# Patient Record
Sex: FEMALE | Race: OTHER | NOT HISPANIC OR LATINO
[De-identification: names, ages, dates, MRNs, and addresses within clinical notes are randomized per-mention and may not be internally consistent; named-entity substitution may affect disease eponyms.]

---

## 2017-02-06 ENCOUNTER — APPOINTMENT (OUTPATIENT)
Dept: HUMAN REPRODUCTION | Facility: CLINIC | Age: 47
End: 2017-02-06

## 2017-02-07 RX ORDER — DOXYCYCLINE HYCLATE 100 MG/1
100 CAPSULE ORAL
Qty: 5 | Refills: 0 | Status: ACTIVE | COMMUNITY
Start: 2017-02-06 | End: 1900-01-01

## 2022-04-19 ENCOUNTER — EMERGENCY (EMERGENCY)
Facility: HOSPITAL | Age: 52
LOS: 1 days | Discharge: HOME | End: 2022-04-19
Attending: EMERGENCY MEDICINE | Admitting: EMERGENCY MEDICINE
Payer: COMMERCIAL

## 2022-04-19 VITALS
DIASTOLIC BLOOD PRESSURE: 74 MMHG | WEIGHT: 149.91 LBS | RESPIRATION RATE: 18 BRPM | HEART RATE: 110 BPM | OXYGEN SATURATION: 100 % | TEMPERATURE: 98 F | HEIGHT: 65 IN | SYSTOLIC BLOOD PRESSURE: 132 MMHG

## 2022-04-19 VITALS
OXYGEN SATURATION: 100 % | HEART RATE: 83 BPM | DIASTOLIC BLOOD PRESSURE: 78 MMHG | SYSTOLIC BLOOD PRESSURE: 111 MMHG | TEMPERATURE: 98 F | RESPIRATION RATE: 18 BRPM

## 2022-04-19 DIAGNOSIS — N93.9 ABNORMAL UTERINE AND VAGINAL BLEEDING, UNSPECIFIED: ICD-10-CM

## 2022-04-19 DIAGNOSIS — Z20.822 CONTACT WITH AND (SUSPECTED) EXPOSURE TO COVID-19: ICD-10-CM

## 2022-04-19 DIAGNOSIS — N85.2 HYPERTROPHY OF UTERUS: ICD-10-CM

## 2022-04-19 DIAGNOSIS — R42 DIZZINESS AND GIDDINESS: ICD-10-CM

## 2022-04-19 DIAGNOSIS — Z88.0 ALLERGY STATUS TO PENICILLIN: ICD-10-CM

## 2022-04-19 DIAGNOSIS — Z88.2 ALLERGY STATUS TO SULFONAMIDES: ICD-10-CM

## 2022-04-19 DIAGNOSIS — D25.1 INTRAMURAL LEIOMYOMA OF UTERUS: ICD-10-CM

## 2022-04-19 DIAGNOSIS — I10 ESSENTIAL (PRIMARY) HYPERTENSION: ICD-10-CM

## 2022-04-19 DIAGNOSIS — E78.5 HYPERLIPIDEMIA, UNSPECIFIED: ICD-10-CM

## 2022-04-19 LAB
ALBUMIN SERPL ELPH-MCNC: 3.7 G/DL — SIGNIFICANT CHANGE UP (ref 3.3–5)
ALP SERPL-CCNC: 71 U/L — SIGNIFICANT CHANGE UP (ref 40–120)
ALT FLD-CCNC: 17 U/L — SIGNIFICANT CHANGE UP (ref 10–45)
ANION GAP SERPL CALC-SCNC: 13 MMOL/L — SIGNIFICANT CHANGE UP (ref 5–17)
APPEARANCE UR: CLEAR — SIGNIFICANT CHANGE UP
APTT BLD: 27.2 SEC — LOW (ref 27.5–35.5)
AST SERPL-CCNC: 26 U/L — SIGNIFICANT CHANGE UP (ref 10–40)
BACTERIA # UR AUTO: PRESENT /HPF
BASOPHILS # BLD AUTO: 0.03 K/UL — SIGNIFICANT CHANGE UP (ref 0–0.2)
BASOPHILS NFR BLD AUTO: 0.8 % — SIGNIFICANT CHANGE UP (ref 0–2)
BILIRUB SERPL-MCNC: 0.3 MG/DL — SIGNIFICANT CHANGE UP (ref 0.2–1.2)
BILIRUB UR-MCNC: NEGATIVE — SIGNIFICANT CHANGE UP
BLD GP AB SCN SERPL QL: NEGATIVE — SIGNIFICANT CHANGE UP
BUN SERPL-MCNC: 11 MG/DL — SIGNIFICANT CHANGE UP (ref 7–23)
CALCIUM SERPL-MCNC: 9.1 MG/DL — SIGNIFICANT CHANGE UP (ref 8.4–10.5)
CHLORIDE SERPL-SCNC: 104 MMOL/L — SIGNIFICANT CHANGE UP (ref 96–108)
CO2 SERPL-SCNC: 23 MMOL/L — SIGNIFICANT CHANGE UP (ref 22–31)
COLOR SPEC: YELLOW — SIGNIFICANT CHANGE UP
CREAT SERPL-MCNC: 0.8 MG/DL — SIGNIFICANT CHANGE UP (ref 0.5–1.3)
DIFF PNL FLD: ABNORMAL
EGFR: 89 ML/MIN/1.73M2 — SIGNIFICANT CHANGE UP
EOSINOPHIL # BLD AUTO: 0.13 K/UL — SIGNIFICANT CHANGE UP (ref 0–0.5)
EOSINOPHIL NFR BLD AUTO: 3.6 % — SIGNIFICANT CHANGE UP (ref 0–6)
EPI CELLS # UR: SIGNIFICANT CHANGE UP /HPF (ref 0–5)
GLUCOSE SERPL-MCNC: 97 MG/DL — SIGNIFICANT CHANGE UP (ref 70–99)
GLUCOSE UR QL: NEGATIVE — SIGNIFICANT CHANGE UP
HCG SERPL-ACNC: <0 MIU/ML — SIGNIFICANT CHANGE UP
HCT VFR BLD CALC: 30.7 % — LOW (ref 34.5–45)
HGB BLD-MCNC: 9.6 G/DL — LOW (ref 11.5–15.5)
IMM GRANULOCYTES NFR BLD AUTO: 0.3 % — SIGNIFICANT CHANGE UP (ref 0–1.5)
INR BLD: 1.02 — SIGNIFICANT CHANGE UP (ref 0.88–1.16)
KETONES UR-MCNC: ABNORMAL MG/DL
LEUKOCYTE ESTERASE UR-ACNC: NEGATIVE — SIGNIFICANT CHANGE UP
LYMPHOCYTES # BLD AUTO: 0.87 K/UL — LOW (ref 1–3.3)
LYMPHOCYTES # BLD AUTO: 24 % — SIGNIFICANT CHANGE UP (ref 13–44)
MCHC RBC-ENTMCNC: 28.7 PG — SIGNIFICANT CHANGE UP (ref 27–34)
MCHC RBC-ENTMCNC: 31.3 GM/DL — LOW (ref 32–36)
MCV RBC AUTO: 91.9 FL — SIGNIFICANT CHANGE UP (ref 80–100)
MONOCYTES # BLD AUTO: 0.4 K/UL — SIGNIFICANT CHANGE UP (ref 0–0.9)
MONOCYTES NFR BLD AUTO: 11 % — SIGNIFICANT CHANGE UP (ref 2–14)
NEUTROPHILS # BLD AUTO: 2.19 K/UL — SIGNIFICANT CHANGE UP (ref 1.8–7.4)
NEUTROPHILS NFR BLD AUTO: 60.3 % — SIGNIFICANT CHANGE UP (ref 43–77)
NITRITE UR-MCNC: NEGATIVE — SIGNIFICANT CHANGE UP
NRBC # BLD: 0 /100 WBCS — SIGNIFICANT CHANGE UP (ref 0–0)
PH UR: 6 — SIGNIFICANT CHANGE UP (ref 5–8)
PLATELET # BLD AUTO: 321 K/UL — SIGNIFICANT CHANGE UP (ref 150–400)
POTASSIUM SERPL-MCNC: 3.5 MMOL/L — SIGNIFICANT CHANGE UP (ref 3.5–5.3)
POTASSIUM SERPL-SCNC: 3.5 MMOL/L — SIGNIFICANT CHANGE UP (ref 3.5–5.3)
PROT SERPL-MCNC: 6.7 G/DL — SIGNIFICANT CHANGE UP (ref 6–8.3)
PROT UR-MCNC: NEGATIVE MG/DL — SIGNIFICANT CHANGE UP
PROTHROM AB SERPL-ACNC: 12.2 SEC — SIGNIFICANT CHANGE UP (ref 10.5–13.4)
RBC # BLD: 3.34 M/UL — LOW (ref 3.8–5.2)
RBC # FLD: 13.3 % — SIGNIFICANT CHANGE UP (ref 10.3–14.5)
RBC CASTS # UR COMP ASSIST: ABNORMAL /HPF
RH IG SCN BLD-IMP: POSITIVE — SIGNIFICANT CHANGE UP
SARS-COV-2 RNA SPEC QL NAA+PROBE: NEGATIVE — SIGNIFICANT CHANGE UP
SODIUM SERPL-SCNC: 140 MMOL/L — SIGNIFICANT CHANGE UP (ref 135–145)
SP GR SPEC: 1.02 — SIGNIFICANT CHANGE UP (ref 1–1.03)
UROBILINOGEN FLD QL: 0.2 E.U./DL — SIGNIFICANT CHANGE UP
WBC # BLD: 3.63 K/UL — LOW (ref 3.8–10.5)
WBC # FLD AUTO: 3.63 K/UL — LOW (ref 3.8–10.5)
WBC UR QL: < 5 /HPF — SIGNIFICANT CHANGE UP

## 2022-04-19 PROCEDURE — 76830 TRANSVAGINAL US NON-OB: CPT

## 2022-04-19 PROCEDURE — 36415 COLL VENOUS BLD VENIPUNCTURE: CPT

## 2022-04-19 PROCEDURE — 80053 COMPREHEN METABOLIC PANEL: CPT

## 2022-04-19 PROCEDURE — 76830 TRANSVAGINAL US NON-OB: CPT | Mod: 26

## 2022-04-19 PROCEDURE — 86901 BLOOD TYPING SEROLOGIC RH(D): CPT

## 2022-04-19 PROCEDURE — 76856 US EXAM PELVIC COMPLETE: CPT | Mod: 26

## 2022-04-19 PROCEDURE — 85730 THROMBOPLASTIN TIME PARTIAL: CPT

## 2022-04-19 PROCEDURE — 81001 URINALYSIS AUTO W/SCOPE: CPT

## 2022-04-19 PROCEDURE — 87635 SARS-COV-2 COVID-19 AMP PRB: CPT

## 2022-04-19 PROCEDURE — 85610 PROTHROMBIN TIME: CPT

## 2022-04-19 PROCEDURE — 85025 COMPLETE CBC W/AUTO DIFF WBC: CPT

## 2022-04-19 PROCEDURE — 86900 BLOOD TYPING SEROLOGIC ABO: CPT

## 2022-04-19 PROCEDURE — 99284 EMERGENCY DEPT VISIT MOD MDM: CPT | Mod: 25

## 2022-04-19 PROCEDURE — 76856 US EXAM PELVIC COMPLETE: CPT

## 2022-04-19 PROCEDURE — 86850 RBC ANTIBODY SCREEN: CPT

## 2022-04-19 PROCEDURE — 84702 CHORIONIC GONADOTROPIN TEST: CPT

## 2022-04-19 PROCEDURE — 99285 EMERGENCY DEPT VISIT HI MDM: CPT

## 2022-04-19 NOTE — CONSULT NOTE ADULT - ASSESSMENT
52yo  with LMP 2 weeks ago presents with HMB likely 2/2 uterine fibroids.  - TVUS showed bulky multifibroid uterus. Patient with long hx of uterine fibroids leading to HMB which temporarily improved in the past after surgical management.  - No active bleeding on exam today, patient without symptoms of anemia. Hgb reassuring at 9.6. No acute GYN interventions needed at this time.  - Patient will follow up with her OB Dr. Ron this week. Patient counselled that she will need an EMB and consultation to discuss medical vs surgical management of her recurrent fibroids.  - d/w Dr. Kimball 54yo  with LMP 2 weeks ago presents with HMB likely 2/2 uterine fibroids.  - TVUS showed bulky multifibroid uterus. Patient with long hx of uterine fibroids leading to HMB which temporarily improved in the past after surgical management.  - No active bleeding on exam today, patient without symptoms of anemia. Hgb reassuring at 9.6. VSS ( on presentation, manual pulse performed at bedside 86bpm prior to d/c) No acute GYN interventions needed at this time.  - Patient will follow up with her OB Dr. Ron this week. Patient counselled that she will need an EMB and consultation to discuss medical vs surgical management of her recurrent fibroids.  - d/w Dr. Kimball

## 2022-04-19 NOTE — ED PROVIDER NOTE - NSFOLLOWUPINSTRUCTIONS_ED_ALL_ED_FT
Please follow up tomorrow with Dr. Ron as instructed      Uterine Fibroids       Uterine fibroids, also called leiomyomas, are noncancerous (benign) tumors that can grow in the uterus. They can cause heavy menstrual bleeding and pain. Fibroids may also grow in the fallopian tubes, cervix, or tissues (ligaments) near the uterus.    You may have one or many fibroids. Fibroids vary in size, weight, and where they grow in the uterus. Some can become quite large. Most fibroids do not require medical treatment.      What are the causes?    The cause of this condition is not known.      What increases the risk?    You are more likely to develop this condition if you:  •Are in your 30s or 40s and have not gone through menopause.      •Have a family history of this condition.      •Are of  descent.      •Started your menstrual period at age 10 or younger.      •Have never given birth.      •Are overweight or obese.        What are the signs or symptoms?    Many women do not have any symptoms. Symptoms of this condition may include:  •Heavy menstrual bleeding.      •Bleeding between menstrual periods.      •Pain and pressure in the pelvic area, between your hip bones.      •Pain during sex.      •Bladder problems, such as needing to urinate right away or more often than usual.      •Inability to have children (infertility).      •Failure to carry pregnancy to term (miscarriage).        How is this diagnosed?    This condition may be diagnosed based on:  •Your symptoms and medical history.      •A physical exam.      •A pelvic exam that includes feeling for any tumors.      •Imaging tests, such as ultrasound or MRI.        How is this treated?    Treatment for this condition may include follow-up visits with your health care provider to monitor your fibroids for any changes. Other treatment may include:•Medicines, such as:   •Medicines to relieve pain, including aspirin and NSAIDs, such as ibuprofen or naproxen.      •Hormone therapy. Treatment may be given as a pill or an injection, or it may be inserted into the uterus using an intrauterine device (IUD).      •Surgery that would do one of the following:  •Remove the fibroids (myomectomy). This may be recommended if fibroids affect your fertility and you want to become pregnant.      •Remove the uterus (hysterectomy).      •Block the blood supply to the fibroids (uterine artery embolization). This can cause them to shrink and die.          Follow these instructions at home:    Medicines     •Take over-the-counter and prescription medicines only as told by your health care provider.      •Ask your health care provider if you should take iron pills or eat more iron-rich foods, such as dark green, leafy vegetables. Heavy menstrual bleeding can cause low iron levels.        Managing pain      If directed, apply heat to your back or abdomen to reduce pain. Use the heat source that your health care provider recommends, such as a moist heat pack or a heating pad. To apply heat:  •Place a towel between your skin and the heat source.      •Leave the heat on for 20–30 minutes.      •Remove the heat if your skin turns bright red. This is especially important if you are unable to feel pain, heat, or cold. You may have a greater risk of getting burned.      General instructions   •Pay close attention to your menstrual cycle. Tell your health care provider about any changes, such as:  •Heavier bleeding that requires you to change your pads or tampons more than usual.      •A change in the number of days that your menstrual period lasts.      •A change in symptoms that come with your menstrual period, such as back pain or cramps in your abdomen.        •Keep all follow-up visits. This is important, especially if your fibroids need to be monitored for any changes.        Contact a health care provider if you:    •Have pelvic pain, back pain, or cramps in your abdomen that do not get better with medicine or heat.      •Develop new bleeding between menstrual periods.      •Have increased bleeding during or between menstrual periods.      •Feel more tired or weak than usual.      •Feel light-headed.        Get help right away if you:    •Faint.      •Have pelvic pain that suddenly gets worse.      •Have severe vaginal bleeding that soaks a tampon or pad in 30 minutes or less.        Summary    •Uterine fibroids are noncancerous (benign) tumors that can develop in the uterus.      •The exact cause of this condition is not known.      •Most fibroids do not require medical treatment unless they affect your ability to have children (fertility).      •Contact a health care provider if you have pelvic pain, back pain, or cramps in your abdomen that do not get better with medicines.      •Get help right away if you faint, have pelvic pain that suddenly gets worse, or have severe vaginal bleeding.      This information is not intended to replace advice given to you by your health care provider. Make sure you discuss any questions you have with your health care provider.

## 2022-04-19 NOTE — ED PROVIDER NOTE - CARE PROVIDER_API CALL
Ede Ron  OBSTETRICS AND GYNECOLOGY  [address] [place] [id number]  Phone: [phone]  Fax: [phone]  Follow Up Time:

## 2022-04-19 NOTE — ED ADULT NURSE NOTE - OBJECTIVE STATEMENT
Pt presents co vaginal bleeding 1 month ago worst in the 2 days, says bleeding becoming heavier, going through 6 tampons per day.  Hx of uterine fibroids requiring myomectomy/ blood transfusion. denies lightheadedness, dizziness.

## 2022-04-19 NOTE — ED PROVIDER NOTE - ATTENDING APP SHARED VISIT CONTRIBUTION OF CARE
53 yo F with PMH of HTN and PSH of myomectomy x 2 p/w vaginal bleeding x 2 weeks. Reports menses started 2 weeks ago, has been bleeding since. Bleeding increased on Sunday evening w/ clots, changing tampon/pad q2 hours. Felt lightheaded last night. Reports menses prolonged since January. Pt with h/o blood transfusion yrs ago. Denies f/c, headache, fainting, chest pain, sob, palpitations, abd pain, nvd, urinary sxs, trauma. VS noted. Pt mildly tachycardia, well appearing, Abd: nondistended, soft, nontender, + palpable fibroid uterus, no cvat. Pelvic exam per PA note. Labs/studies noted. Stable h/h, no active bleeding on exam. Sonogram w/ mult fibroids. Pt evaliated by GYN and can f/u tomorrow w/ Dr. Ron. Strict return parameters given.

## 2022-04-19 NOTE — ED PROVIDER NOTE - PATIENT PORTAL LINK FT
You can access the FollowMyHealth Patient Portal offered by [facility] by registering at the following website: [email or web address]. By joining [facility]’s FollowMyHealth portal, you will also be able to view your health information using other applications (apps) compatible with our system.

## 2022-04-19 NOTE — ED PROVIDER NOTE - PHYSICAL EXAMINATION
Vitals reviewed  Gen: well appearing, nad, speaking in full sentences  Skin: wwp, no rash/lesions  HEENT: ncat, eomi, mmm  CV: tachycardia, regular rhythm, no audible m/r/g  Resp: symmetrical expansion, ctab, no w/r/r  Abd: nondistended, soft, nontender, + palpable fibroid uterus, no cvat  Pelvic: clots noted in vaginal canal, no active bleeding, no cmt, no adnexal mass or ttp, palpable fibroid uterus nontender  Ext: FROM throughout, no peripheral edema  Neuro: alert/oriented, no focal deficits, steady gait

## 2022-04-19 NOTE — CONSULT NOTE ADULT - SUBJECTIVE AND OBJECTIVE BOX
54yo  with LMP 2 weeks ago presents with HMB.  She reports long hx of HMB 2/2 fibroids. She had a hysteroscopic myomectomy in , abd myomectomy x2 around  which were a few years apart (patient does not remember), with improvement in HMB for a few years after. She has tried OCP's a number of years ago but stated that it worsened her bleeding. Her current run of heavy bleeding began in January where she had HMB for 2 weeks, with spotting after, then her next menses was also prolonged at 2 weeks, with spotting after until her next cycle. Her LMP started 2 weeks ago and she has saturated 2 pads per hour since then. She had some dizziness yesterday which resolved. Denies CP, SOB, dizziness, N/V, issues with voiding or BM. Patient see's Dr. Ron for her GYN care, and plans to see him tomorrow for consultation for AUB.    OBhx:  vtop D+C  GYnhx: fibroids as per HPI  PMH: HTN, HLD  PSH: abd myomectomy x 2 around , hysteroscopy myomectomy in   meds: nifedipine 60mg qd, HCTZ 12.5mg qd, atorvastatin  all: bactrim and penicillin (Hives for both)  social: denies    Vital Signs Last 24 Hrs  T(C): 36.7 (2022 09:18), Max: 36.7 (2022 09:18)  T(F): 98 (2022 09:18), Max: 98 (2022 09:18)  HR: 110 (2022 09:18) (110 - 110)  BP: 132/74 (2022 09:18) (132/74 - 132/74)  BP(mean): --  RR: 18 (2022 09:18) (18 - 18)  SpO2: 100% (2022 09:18) (100% - 100%)    Physical Exam:  Gen: NAD, comfortable  GI: soft, nontender, nondistended + BS, no rebound no guarding. Uterus enlarged.  BME: uterus noted to be about 15w in size, no adnexal masses appreciated , No cervical motion tenderness. Posterior fornix with mass palpated, likely fibroid.  Spec: 5cc blood in vaginal vault. Normal cervix and vaginal vault, no active bleeding even with valsalva    LABS:                        9.6    3.63  )-----------( 321      ( 2022 10:43 )             30.7           PT/INR - ( 2022 10:43 )   PT: 12.2 sec;   INR: 1.02          PTT - ( 2022 10:43 )  PTT:27.2 sec  Urinalysis Basic - ( 2022 10:20 )    Color: Yellow / Appearance: Clear / S.025 / pH: x  Gluc: x / Ketone: Trace mg/dL  / Bili: Negative / Urobili: 0.2 E.U./dL   Blood: x / Protein: NEGATIVE mg/dL / Nitrite: NEGATIVE   Leuk Esterase: NEGATIVE / RBC: Many /HPF / WBC < 5 /HPF   Sq Epi: x / Non Sq Epi: 0-5 /HPF / Bacteria: Present /HPF        RADIOLOGY & ADDITIONAL STUDIES:   54yo  with LMP 2 weeks ago presents with HMB.  She reports long hx of HMB 2/2 fibroids. She had a hysteroscopic myomectomy in , abd myomectomy x2 around  which were a few years apart (patient does not remember), with improvement in HMB for a few years after. She has tried OCP's a number of years ago but stated that it worsened her bleeding. Her current run of heavy bleeding began in January where she had HMB for 2 weeks, with spotting after, then her next menses was also prolonged at 2 weeks, with spotting after until her next cycle. Her LMP started 2 weeks ago and she has saturated 2 pads per hour since then. She had some dizziness yesterday which resolved. Denies CP, SOB, dizziness, N/V, issues with voiding or BM. Patient see's Dr. Ron for her GYN care, and plans to see him tomorrow for consultation for AUB.    OBhx:  vtop D+C  GYnhx: fibroids as per HPI  PMH: HTN, HLD  PSH: abd myomectomy x 2 around , hysteroscopy myomectomy in   meds: nifedipine 60mg qd, HCTZ 12.5mg qd, atorvastatin  all: bactrim and penicillin (Hives for both)  social: denies    Vital Signs Last 24 Hrs  T(C): 36.7 (2022 09:18), Max: 36.7 (2022 09:18)  T(F): 98 (2022 09:18), Max: 98 (2022 09:18)  HR: 110 (2022 09:18) (110 - 110)  BP: 132/74 (2022 09:18) (132/74 - 132/74)  BP(mean): --  RR: 18 (2022 09:18) (18 - 18)  SpO2: 100% (2022 09:18) (100% - 100%)    Physical Exam:  Gen: NAD, comfortable  GI: soft, nontender, nondistended + BS, no rebound no guarding. Uterus enlarged.  BME: uterus noted to be about 15w in size, no adnexal masses appreciated , No cervical motion tenderness. Posterior fornix with mass palpated, likely fibroid.  Spec: 5cc blood in vaginal vault. Normal cervix and vaginal vault, no active bleeding even with valsalva    LABS:                        9.6    3.63  )-----------( 321      ( 2022 10:43 )             30.7           PT/INR - ( 2022 10:43 )   PT: 12.2 sec;   INR: 1.02          PTT - ( 2022 10:43 )  PTT:27.2 sec  Urinalysis Basic - ( 2022 10:20 )    Color: Yellow / Appearance: Clear / S.025 / pH: x  Gluc: x / Ketone: Trace mg/dL  / Bili: Negative / Urobili: 0.2 E.U./dL   Blood: x / Protein: NEGATIVE mg/dL / Nitrite: NEGATIVE   Leuk Esterase: NEGATIVE / RBC: Many /HPF / WBC < 5 /HPF   Sq Epi: x / Non Sq Epi: 0-5 /HPF / Bacteria: Present /HPF        RADIOLOGY & ADDITIONAL STUDIES:    ACC: [id number] EXAM:  US TRANSVAGINAL                        ACC: [id number] EXAM:  US PELVIC COMPLETE                          PROCEDURE DATE:  2022          INTERPRETATION:  CLINICAL INFORMATION: Vaginal bleeding. Fibroids.   History of myomectomy.    LMP: 4/3/2022.    COMPARISON: None available.    TECHNIQUE:  Endovaginal and transabdominal pelvic sonogram. Color and Spectral   Doppler was performed.    FINDINGS:  Uterus: 13.6 cm x 10.5 cm x 10.8 cm. Uterus is enlarged and lobular.   There are several fibroids. 4.6 x 4.4 x 4.9 cm fibroid anterior body. 2.9   x 2.4 x 2.5 cm calcified intramural fibroid right body. 2.9 x 3.4 x 4.0   cm intramural fibroid posterior fundus.  Endometrium: Incompletely visualized due to multiple fibroids. Visualized   portion measures 0.7 cm.    Right ovary: 3.2 cm x 1.7 cm x 1.3 cm. Within normal limits. Normal   arterial and venous waveforms.  Left ovary: Not visualized.    Fluid: None.    IMPRESSION:  1. Enlarged, leiomyomatous uterus.    2. Endometrium is incompletely visualized due to the multiple fibroids.   If indicated, MRI of pelvis can be performed for better evaluation.    3. Left ovary not visualized due to enlarged uterus. Again, MRI could be   performed for better evaluation.        --- End of Report ---            TEVIN SANCHEZ MD; Attending Radiologist  This document has been electronically signed. 2022  2:59PM

## 2022-04-19 NOTE — ED PROVIDER NOTE - CLINICAL SUMMARY MEDICAL DECISION MAKING FREE TEXT BOX
52 F pmh htn, psh myomectomy x2 p/w vaginal bleeding x 2 weeks., changes pad/tampon q2hrs since Sunday night, + lightheadedness. 52 F pmh htn, psh myomectomy x2 p/w vaginal bleeding x 2 weeks., changes pad/tampon q2hrs since Sunday night, + lightheadedness.  pt mild tachycardia, well appearing, Abd: nondistended, soft, nontender, + palpable fibroid uterus, no cvat; Pelvic: clots noted in vaginal canal, no active bleeding, no cmt, no adnexal mass or ttp, palpable fibroid uterus nontender.  will obtain labs, sonogram, c/s gyn (follows w/ Dr. Ron)

## 2022-04-19 NOTE — ED PROVIDER NOTE - NS ED ATTENDING STATEMENT MOD
This was a shared visit with the TONEY. I reviewed and verified the documentation and independently performed the documented:

## 2022-04-19 NOTE — ED ADULT TRIAGE NOTE - CHIEF COMPLAINT QUOTE
Pt presents co vaginal bleeding x14 days, says bleeding becoming heavier, going through one tampon q2h.  Hx of uterine fibroids requiring myomectomy. denies lightheadedness, dizziness.

## 2022-04-19 NOTE — ED PROVIDER NOTE - OBJECTIVE STATEMENT
52 F pmh htn, psh myomectomy x2 p/w vaginal bleeding x 2 weeks.  pt reports menses started 2 weeks ago, has been bleeding since.  Increased Sunday evening w/ clots, changing tampon/pad q2 hours.  felt lightheaded last night.  reports menses prolonged since January.  h/o blood transfusion x 1 8 yrs ago.  denies f/c, headache, fainting, chest pain, sob, palpitations, abd pain, nvd, urinary sxs, trauma  follows w/ Dr. Ron

## 2022-04-19 NOTE — ED PROVIDER NOTE - PROGRESS NOTE DETAILS
stable h/h, no active bleeding on exam.  sonogram w/ mult fibroids.  eval by GYN and can f/u tomorrow w/ Dr. Ron.  discussed strict return parameters

## 2022-04-20 PROBLEM — D21.9 BENIGN NEOPLASM OF CONNECTIVE AND OTHER SOFT TISSUE, UNSPECIFIED: Chronic | Status: ACTIVE | Noted: 2022-04-19

## 2022-05-06 ENCOUNTER — APPOINTMENT (OUTPATIENT)
Dept: MRI IMAGING | Facility: HOSPITAL | Age: 52
End: 2022-05-06
Payer: COMMERCIAL

## 2022-05-13 ENCOUNTER — APPOINTMENT (OUTPATIENT)
Dept: MRI IMAGING | Facility: HOSPITAL | Age: 52
End: 2022-05-13

## 2022-05-13 ENCOUNTER — OUTPATIENT (OUTPATIENT)
Dept: OUTPATIENT SERVICES | Facility: HOSPITAL | Age: 52
LOS: 1 days | End: 2022-05-13
Payer: COMMERCIAL

## 2022-05-13 PROCEDURE — A9585: CPT

## 2022-05-13 PROCEDURE — 72197 MRI PELVIS W/O & W/DYE: CPT | Mod: 26

## 2022-05-13 PROCEDURE — 72197 MRI PELVIS W/O & W/DYE: CPT

## 2022-06-28 ENCOUNTER — APPOINTMENT (OUTPATIENT)
Dept: INTERVENTIONAL RADIOLOGY/VASCULAR | Facility: HOSPITAL | Age: 52
End: 2022-06-28

## 2022-06-28 DIAGNOSIS — I10 ESSENTIAL (PRIMARY) HYPERTENSION: ICD-10-CM

## 2022-06-28 DIAGNOSIS — D25.1 INTRAMURAL LEIOMYOMA OF UTERUS: ICD-10-CM

## 2022-06-28 DIAGNOSIS — D25.0 INTRAMURAL LEIOMYOMA OF UTERUS: ICD-10-CM

## 2022-06-28 PROCEDURE — 99203 OFFICE O/P NEW LOW 30 MIN: CPT | Mod: 95

## 2022-06-28 NOTE — HISTORY OF PRESENT ILLNESS
[FreeTextEntry1] : Apurva Hopson is a 52 year old  first diagnosed with fibroids 10 years ago status post myomectomy x 2 with menorrhagia 7 days dyspareunia, abdominal pressure, cramps and urinary frequency.  MRI shows multifibroid uterus with two submucosal partially intracavitary fibroids largest 3.7 cm. [Menorrhagia] : ~T menorrhagia [Pelvic Pain] : pelvic pain [Urinary Frequency] : urinary frequency [Pressure] : pressure [Bloating] : bloating [Myomectomy] : myomectomy [Last Menstrual Period (___)] : Last menstrual period [unfilled] [Monthly Cycles Regular] : monthly cycles are regular [Bleeding In Between Periods] : no bleeding in between periods [Menopausal Symptoms] : no complaints of menopausal symptoms [Post Menopause] : not post menopausal [G ___] : G [unfilled] [P___] : P [unfilled]

## 2022-06-28 NOTE — CONSULT LETTER
[Dear  ___] : Dear  [unfilled], [Consult Letter:] : I had the pleasure of evaluating your patient, [unfilled]. [Please see my note below.] : Please see my note below. [Consult Closing:] : Thank you very much for allowing me to participate in the care of this patient.  If you have any questions, please do not hesitate to contact me. [Sincerely,] : Sincerely, [FreeTextEntry3] : Han Eckert MD, FSIR\par Chief Interventional Radiology\par [facility]\par [facility]\par

## 2022-06-28 NOTE — ASSESSMENT
[FreeTextEntry1] : 52 year old with menorrhagia secondary to uterine fibroids.  She is a candidate for UAE.  The procedure and possible complications including detachment of intracavitary fibroids requiring surgical removal or possible hysterectomy were discussed at length and the patient had opportunity to ask questions.

## 2022-07-25 ENCOUNTER — APPOINTMENT (OUTPATIENT)
Dept: INTERVENTIONAL RADIOLOGY/VASCULAR | Facility: HOSPITAL | Age: 52
End: 2022-07-25

## 2022-07-25 ENCOUNTER — OUTPATIENT (OUTPATIENT)
Dept: OUTPATIENT SERVICES | Facility: HOSPITAL | Age: 52
LOS: 1 days | End: 2022-07-25
Payer: COMMERCIAL

## 2022-07-25 ENCOUNTER — RESULT REVIEW (OUTPATIENT)
Age: 52
End: 2022-07-25

## 2022-07-25 PROCEDURE — C1887: CPT

## 2022-07-25 PROCEDURE — 37243 VASC EMBOLIZE/OCCLUDE ORGAN: CPT

## 2022-07-25 PROCEDURE — 36247 INS CATH ABD/L-EXT ART 3RD: CPT | Mod: 59

## 2022-07-25 PROCEDURE — 36247 INS CATH ABD/L-EXT ART 3RD: CPT

## 2022-07-25 PROCEDURE — C1889: CPT

## 2022-07-25 PROCEDURE — C1769: CPT

## 2022-07-25 PROCEDURE — C1894: CPT

## 2022-07-25 RX ORDER — ONDANSETRON 8 MG/1
1 TABLET, FILM COATED ORAL
Qty: 9 | Refills: 0
Start: 2022-07-25 | End: 2022-07-27

## 2022-07-25 RX ORDER — OXYCODONE HYDROCHLORIDE 5 MG/1
1 TABLET ORAL
Qty: 20 | Refills: 0
Start: 2022-07-25 | End: 2022-07-29

## 2022-07-25 RX ORDER — IBUPROFEN 200 MG
1 TABLET ORAL
Qty: 40 | Refills: 0
Start: 2022-07-25 | End: 2022-08-03

## 2022-08-15 ENCOUNTER — APPOINTMENT (OUTPATIENT)
Dept: INTERVENTIONAL RADIOLOGY/VASCULAR | Facility: HOSPITAL | Age: 52
End: 2022-08-15

## 2022-08-15 PROCEDURE — XXXXX: CPT

## 2022-09-16 ENCOUNTER — APPOINTMENT (OUTPATIENT)
Dept: MRI IMAGING | Facility: HOSPITAL | Age: 52
End: 2022-09-16

## 2022-09-16 ENCOUNTER — OUTPATIENT (OUTPATIENT)
Dept: OUTPATIENT SERVICES | Facility: HOSPITAL | Age: 52
LOS: 1 days | End: 2022-09-16
Payer: COMMERCIAL

## 2022-09-16 ENCOUNTER — RESULT REVIEW (OUTPATIENT)
Age: 52
End: 2022-09-16

## 2022-09-16 PROCEDURE — A9585: CPT

## 2022-09-16 PROCEDURE — 72197 MRI PELVIS W/O & W/DYE: CPT

## 2022-09-16 PROCEDURE — 72197 MRI PELVIS W/O & W/DYE: CPT | Mod: 26

## 2022-09-19 ENCOUNTER — APPOINTMENT (OUTPATIENT)
Dept: INTERVENTIONAL RADIOLOGY/VASCULAR | Facility: HOSPITAL | Age: 52
End: 2022-09-19
Payer: COMMERCIAL

## 2022-09-19 PROCEDURE — XXXXX: CPT | Mod: 1L

## 2023-02-11 ENCOUNTER — OUTPATIENT (OUTPATIENT)
Dept: OUTPATIENT SERVICES | Facility: HOSPITAL | Age: 53
LOS: 1 days | End: 2023-02-11
Payer: COMMERCIAL

## 2023-02-11 ENCOUNTER — APPOINTMENT (OUTPATIENT)
Dept: MRI IMAGING | Facility: HOSPITAL | Age: 53
End: 2023-02-11
Payer: COMMERCIAL

## 2023-02-11 PROCEDURE — A9585: CPT

## 2023-02-11 PROCEDURE — 72197 MRI PELVIS W/O & W/DYE: CPT

## 2023-02-11 PROCEDURE — 72197 MRI PELVIS W/O & W/DYE: CPT | Mod: 26

## 2023-02-13 ENCOUNTER — APPOINTMENT (OUTPATIENT)
Dept: INTERVENTIONAL RADIOLOGY/VASCULAR | Facility: HOSPITAL | Age: 53
End: 2023-02-13
Payer: COMMERCIAL

## 2023-02-13 PROCEDURE — XXXXX: CPT

## 2023-08-26 ENCOUNTER — OUTPATIENT (OUTPATIENT)
Dept: OUTPATIENT SERVICES | Facility: HOSPITAL | Age: 53
LOS: 1 days | End: 2023-08-26
Payer: COMMERCIAL

## 2023-08-26 ENCOUNTER — APPOINTMENT (OUTPATIENT)
Dept: MRI IMAGING | Facility: HOSPITAL | Age: 53
End: 2023-08-26

## 2023-08-26 ENCOUNTER — RESULT REVIEW (OUTPATIENT)
Age: 53
End: 2023-08-26

## 2023-08-26 PROCEDURE — 72197 MRI PELVIS W/O & W/DYE: CPT | Mod: 26

## 2023-08-26 PROCEDURE — A9585: CPT

## 2023-08-26 PROCEDURE — 72197 MRI PELVIS W/O & W/DYE: CPT

## 2023-09-05 ENCOUNTER — APPOINTMENT (OUTPATIENT)
Dept: INTERVENTIONAL RADIOLOGY/VASCULAR | Facility: HOSPITAL | Age: 53
End: 2023-09-05

## 2023-09-05 PROCEDURE — XXXXX: CPT | Mod: 1L

## 2023-10-10 ENCOUNTER — LABORATORY RESULT (OUTPATIENT)
Age: 53
End: 2023-10-10

## 2023-10-25 ENCOUNTER — OUTPATIENT (OUTPATIENT)
Dept: OUTPATIENT SERVICES | Facility: HOSPITAL | Age: 53
LOS: 1 days | End: 2023-10-25
Payer: COMMERCIAL

## 2023-10-25 ENCOUNTER — APPOINTMENT (OUTPATIENT)
Dept: INTERVENTIONAL RADIOLOGY/VASCULAR | Facility: HOSPITAL | Age: 53
End: 2023-10-25

## 2023-10-25 ENCOUNTER — RESULT REVIEW (OUTPATIENT)
Age: 53
End: 2023-10-25

## 2023-10-25 VITALS
SYSTOLIC BLOOD PRESSURE: 138 MMHG | OXYGEN SATURATION: 98 % | RESPIRATION RATE: 18 BRPM | HEART RATE: 111 BPM | DIASTOLIC BLOOD PRESSURE: 88 MMHG

## 2023-10-25 PROCEDURE — C1769: CPT

## 2023-10-25 PROCEDURE — C1889: CPT

## 2023-10-25 PROCEDURE — 36247 INS CATH ABD/L-EXT ART 3RD: CPT | Mod: 50

## 2023-10-25 PROCEDURE — C1894: CPT

## 2023-10-25 PROCEDURE — C1887: CPT

## 2023-10-25 PROCEDURE — 36247 INS CATH ABD/L-EXT ART 3RD: CPT | Mod: 59

## 2023-10-25 PROCEDURE — 37243 VASC EMBOLIZE/OCCLUDE ORGAN: CPT

## 2023-10-25 RX ORDER — OXYCODONE HYDROCHLORIDE 5 MG/1
1 TABLET ORAL
Qty: 20 | Refills: 0
Start: 2023-10-25 | End: 2023-10-29

## 2023-10-25 RX ORDER — ONDANSETRON 8 MG/1
1 TABLET, FILM COATED ORAL
Qty: 9 | Refills: 0
Start: 2023-10-25 | End: 2023-10-27

## 2023-10-25 RX ORDER — IBUPROFEN 200 MG
1 TABLET ORAL
Qty: 40 | Refills: 0
Start: 2023-10-25 | End: 2023-11-03

## 2023-10-25 NOTE — PRE-ANESTHESIA EVALUATION ADULT - NSANTHPMHFT_GEN_ALL_CORE
54yo F with HTN, HL, uterine fibroids, prior myomectomy x2 returns for the embolization of recurrent uterine fibroids.

## 2023-10-25 NOTE — PRE-ANESTHESIA EVALUATION ADULT - NSANTHPEFT_GEN_ALL_CORE
Alert, pleasant in no distress  CN grossly intact  Extremities warm without edema  Lungs clear to auscultation  RRR

## 2023-11-15 ENCOUNTER — APPOINTMENT (OUTPATIENT)
Dept: INTERVENTIONAL RADIOLOGY/VASCULAR | Facility: HOSPITAL | Age: 53
End: 2023-11-15

## 2023-11-15 PROCEDURE — XXXXX: CPT | Mod: 1L

## 2024-02-26 ENCOUNTER — OUTPATIENT (OUTPATIENT)
Dept: OUTPATIENT SERVICES | Facility: HOSPITAL | Age: 54
LOS: 1 days | End: 2024-02-26
Payer: COMMERCIAL

## 2024-02-26 ENCOUNTER — APPOINTMENT (OUTPATIENT)
Dept: MRI IMAGING | Facility: HOSPITAL | Age: 54
End: 2024-02-26

## 2024-02-26 ENCOUNTER — RESULT REVIEW (OUTPATIENT)
Age: 54
End: 2024-02-26

## 2024-02-26 PROCEDURE — 72197 MRI PELVIS W/O & W/DYE: CPT | Mod: 26

## 2024-02-26 PROCEDURE — 72197 MRI PELVIS W/O & W/DYE: CPT

## 2024-03-05 ENCOUNTER — APPOINTMENT (OUTPATIENT)
Dept: INTERVENTIONAL RADIOLOGY/VASCULAR | Facility: HOSPITAL | Age: 54
End: 2024-03-05

## 2024-03-05 NOTE — HISTORY OF PRESENT ILLNESS
[FreeTextEntry1] : UAE 10/23.  Has had all periods.  Bleeding is back to normal.  Periods lasting her regular 5 days with normal bleeding.  No cramps.  No discharge or bleeding between periods.  MRI shows devascularized fibroids.  Has GYN appointment soon Patient is pleased with outcome.

## 2024-09-12 NOTE — ED PROVIDER NOTE - CARE PROVIDERS DIRECT ADDRESSES
Continue current medications   
Pt/ot   
[email or web address]